# Patient Record
(demographics unavailable — no encounter records)

---

## 2024-11-01 NOTE — REVIEW OF SYSTEMS
[Negative] : Heme/Lymph [Fatigue] : fatigue [Headache] : headache [Fever] : no fever [Chills] : no chills [Fainting] : no fainting [Confusion] : no confusion [Memory Loss] : no memory loss [FreeTextEntry3] : Eye pressure

## 2024-11-01 NOTE — PHYSICAL EXAM
[No Acute Distress] : no acute distress [Well Nourished] : well nourished [Well Developed] : well developed [Well-Appearing] : well-appearing [Normal Sclera/Conjunctiva] : normal sclera/conjunctiva [PERRL] : pupils equal round and reactive to light [EOMI] : extraocular movements intact [Normal Outer Ear/Nose] : the outer ears and nose were normal in appearance [Normal Oropharynx] : the oropharynx was normal [No Lymphadenopathy] : no lymphadenopathy [Thyroid Normal, No Nodules] : the thyroid was normal and there were no nodules present [No Respiratory Distress] : no respiratory distress  [Clear to Auscultation] : lungs were clear to auscultation bilaterally [Normal Rate] : normal rate  [Regular Rhythm] : with a regular rhythm [Normal S1, S2] : normal S1 and S2 [No Varicosities] : no varicosities [Pedal Pulses Present] : the pedal pulses are present [No Edema] : there was no peripheral edema [No Extremity Clubbing/Cyanosis] : no extremity clubbing/cyanosis [Soft] : abdomen soft [Non Tender] : non-tender [Non-distended] : non-distended [Normal Bowel Sounds] : normal bowel sounds [Normal Posterior Cervical Nodes] : no posterior cervical lymphadenopathy [Normal Anterior Cervical Nodes] : no anterior cervical lymphadenopathy [No CVA Tenderness] : no CVA  tenderness [No Spinal Tenderness] : no spinal tenderness [Grossly Normal Strength/Tone] : grossly normal strength/tone [No Rash] : no rash [No Focal Deficits] : no focal deficits [Normal Gait] : normal gait [Normal Affect] : the affect was normal [Normal Insight/Judgement] : insight and judgment were intact

## 2024-11-01 NOTE — HEALTH RISK ASSESSMENT
[Good] : ~his/her~  mood as  good [No falls in past year] : Patient reported no falls in the past year [0] : 2) Feeling down, depressed, or hopeless: Not at all (0) [PHQ-2 Negative - No further assessment needed] : PHQ-2 Negative - No further assessment needed [Never] : Never [None] : None [Fully functional (bathing, dressing, toileting, transferring, walking, feeding)] : Fully functional (bathing, dressing, toileting, transferring, walking, feeding) [Fully functional (using the telephone, shopping, preparing meals, housekeeping, doing laundry, using] : Fully functional and needs no help or supervision to perform IADLs (using the telephone, shopping, preparing meals, housekeeping, doing laundry, using transportation, managing medications and managing finances) [Reports normal functional visual acuity (ie: able to read med bottle)] : Reports normal functional visual acuity [1 or 2 (0 pts)] : 1 or 2 (0 points) [Never (0 pts)] : Never (0 points) [No] : In the past 12 months have you used drugs other than those required for medical reasons? No [Patient reported mammogram was normal] : Patient reported mammogram was normal [Patient reported PAP Smear was normal] : Patient reported PAP Smear was normal [Patient reported bone density results were normal] : Patient reported bone density results were normal [Patient reported colonoscopy was normal] : Patient reported colonoscopy was normal [With Family] : lives with family [] :  [Sexually Active] : sexually active [Feels Safe at Home] : Feels safe at home [Seat Belt] :  uses seat belt [Patient/Caregiver not ready to engage] : , patient/caregiver not ready to engage [de-identified] : [facility] cardiology, OBGYN Dr Gifford [Audit-CScore] : 0 [[id number]] : 0 [EyeExamDate] : Pending 2024 [Change in mental status noted] : No change in mental status noted [High Risk Behavior] : no high risk behavior [Reports changes in hearing] : Reports no changes in hearing [Reports changes in vision] : Reports no changes in vision [Reports changes in dental health] : Reports no changes in dental health [MammogramDate] : 01/2023 [PapSmearDate] : 01/2023 [BoneDensityComments] : unspecified date [ColonoscopyDate] : 01/2020

## 2024-11-01 NOTE — ASSESSMENT
[FreeTextEntry1] : Patient is a 67yo F with a PMH of HTN and HLD presents for annual wellness exam  #HCM - blood drawn  - Mammo and sono screening referral given - Obgyn pending appt this month for pap - Colonoscopy not due at this time (last done 2020) - Up tp date with shingles, flu and covid booster this year. - EKG done - overall ok but with some signs of LVH, patient to make cardio follow up  #HTN - Patient take Labetalol 100BID, HCTZ 12.5 BID, Losartan 50 BID - BP elevated 150/90 at visit - Patient has labile BP per her BP logs - advised to discuss with cardio as she is due for appt, can consider increasing labetalol dose - dose not tolerate amlodipine due to swelling  #HLD - Cont atorvastatin 20mg  Concussion - continue follow up with Dr. Hudson and vestibular therapy  Interested in possible weight loss meds - check labs - consider wegovy

## 2024-11-01 NOTE — HISTORY OF PRESENT ILLNESS
[FreeTextEntry1] : annual wellness [de-identified] : Patient is a 67yo F with a PMH of HTN and HLD presents to the clinic for an annual wellness exam. Pt reports her previous concussion symptoms have not resolved but have improved, reports queasiness, sensations in her head. States she is following vestibular specialist and has an ophthalmologist appt coming up for possible eye involvement. Reports she has been receiving shots, anti-inflammatory for her knee pain since her skiing accident, has been f/u physical therapy and found to have plantar fasciitis and bone spur. Pt otherwise denies any other concerns or complaints at this time, np headache, cp, sob, diarrhea, constipation.

## 2025-03-11 NOTE — REVIEW OF SYSTEMS
[Nausea] : nausea [Itching] : Itching [Skin Rash] : skin rash [Abdominal Pain] : no abdominal pain [Constipation] : no constipation [Diarrhea] : diarrhea [Vomiting] : no vomiting [Dizziness] : no dizziness [FreeTextEntry4] : no dysphagia, no throat irritation

## 2025-03-11 NOTE — ASSESSMENT
[FreeTextEntry1] :  66 yo F with a PMH of HTN and HLD presents to the clinic for follow up visit after starting zepbound.

## 2025-03-11 NOTE — PHYSICAL EXAM
[No Acute Distress] : no acute distress [Well-Appearing] : well-appearing [Normal Sclera/Conjunctiva] : normal sclera/conjunctiva [Normal Outer Ear/Nose] : the outer ears and nose were normal in appearance [No Respiratory Distress] : no respiratory distress  [No Accessory Muscle Use] : no accessory muscle use [Clear to Auscultation] : lungs were clear to auscultation bilaterally [Normal Rate] : normal rate  [Regular Rhythm] : with a regular rhythm [Normal S1, S2] : normal S1 and S2 [Soft] : abdomen soft [Non Tender] : non-tender [Non-distended] : non-distended [Normal Bowel Sounds] : normal bowel sounds [Normal Affect] : the affect was normal [Normal Insight/Judgement] : insight and judgment were intact [de-identified] : small darkened [place] on right abdomen, nonerythematous. left abdomen with ~ 1inch red circular area, not raised.

## 2025-03-11 NOTE — HISTORY OF PRESENT ILLNESS
[FreeTextEntry1] : follow up  [de-identified] : Patient is a 68 yo F with a PMH of HTN and HLD presents to the clinic for follow up visit. Patient started zepbound (compound) 1 month ago. Has lost 2 lbs since last visit in Nov. 2024. Pts daughter is an RN and injects into patient's abdomen. Pt reports some mild pain at site of injection, also noticed small red [place] that appears at site and is itchy. Rash usually improves after 1 week, but recently rashes have been becoming worse.  No relief with topical Benadryl. No dysphagia or throat closing sensation. Pt has 1-2 days of nausea after med, no emesis, no diarrhea/constipation, no abdominal pain, no dizziness. Denies any food allergies. Denies any history of hives. Denies allergic reactions to any vaccines in past. Pt scheduled for partial left knee replacement surgery on April 11th, pt will obtain preop clearance through surgeon/HSS.

## 2025-03-11 NOTE — PLAN
[FreeTextEntry1] : #obesity - pt on zepbound 2.5 mg weekly for the past month.  - ?allergic reaction to either needle or compounding pharmacy - patient to discuss with pharmacist about possibly trying different needle - if still having local reaction, may need to stop compounding meds  Rash - allergy as above - cortisone cream as needed - notify office if rash spreads or develop fevers  #HTN - Patient take HCTZ 12.5 BID, Losartan 50 BID, doxazosin 1mg qhs - pt with allergy/intolerance to labetalol and amlodipine - bp stable  #HLD - Cont atorvastatin 20mg

## 2025-06-20 NOTE — REVIEW OF SYSTEMS
[Nausea] : nausea [Headache] : headache [Negative] : Genitourinary [de-identified] : +right sided head pressure

## 2025-06-20 NOTE — HISTORY OF PRESENT ILLNESS
[FreeTextEntry1] : obesity  [de-identified] : 67 year old female with pmhx of HTN, HLD, obesity presents for follow up. she previously had a location reaction to zepbound injection on her abdomen but states that it has now completely resolved. Manual /90 in office today. Patient states she checks her BP at home daily and it is around 120-130/80s. She had recent knee surgery in April and has slowly started to increase her activity. She also complains of some right head pressure and queasiness that she attributes to her concussion back in 1/2024. CT head at that time was wnl with a meningioma. She does not follow with neurology. Otherwise denies complaints.

## 2025-06-20 NOTE — ASSESSMENT
[FreeTextEntry1] : 67 year old female with pmhx of obesity, HTN, HLD presents for follow up   #Obesity - Continue Zepbound- try increased dose - 3 lb weight loss - No more local reaction, can continue safely - Counseled on concomitant diet and exercise with the medication for full effectiveness  #HTN - /90 manually today  - Pt reports normal BP readings at home - Continue current meds  - Check daily home BP   #Concussion  - 1/2024 - Pt requests repeat CT however previous CT in 2/2024 wnl with possible meningioma noted - patient having very mild headache on left side - Recommend patient to follow back with Dr. Hudson regarding concussion